# Patient Record
Sex: MALE | Race: OTHER | HISPANIC OR LATINO | Employment: OTHER | URBAN - METROPOLITAN AREA
[De-identification: names, ages, dates, MRNs, and addresses within clinical notes are randomized per-mention and may not be internally consistent; named-entity substitution may affect disease eponyms.]

---

## 2017-12-19 ENCOUNTER — APPOINTMENT (EMERGENCY)
Dept: CT IMAGING | Facility: HOSPITAL | Age: 37
End: 2017-12-19
Payer: COMMERCIAL

## 2017-12-19 ENCOUNTER — HOSPITAL ENCOUNTER (EMERGENCY)
Facility: HOSPITAL | Age: 37
Discharge: HOME | End: 2017-12-19
Attending: EMERGENCY MEDICINE | Admitting: EMERGENCY MEDICINE
Payer: COMMERCIAL

## 2017-12-19 VITALS
OXYGEN SATURATION: 95 % | DIASTOLIC BLOOD PRESSURE: 92 MMHG | HEART RATE: 94 BPM | SYSTOLIC BLOOD PRESSURE: 142 MMHG | TEMPERATURE: 98 F | RESPIRATION RATE: 18 BRPM | WEIGHT: 200.4 LBS

## 2017-12-19 DIAGNOSIS — S30.0XXA CONTUSION OF LOWER BACK, INITIAL ENCOUNTER: Primary | ICD-10-CM

## 2017-12-19 LAB
ALBUMIN SERPL BCP-MCNC: 3.7 G/DL (ref 3.5–5)
ALP SERPL-CCNC: 57 U/L (ref 46–116)
ALT SERPL W P-5'-P-CCNC: 27 U/L (ref 12–78)
ANION GAP SERPL CALCULATED.3IONS-SCNC: 8 MMOL/L (ref 4–13)
AST SERPL W P-5'-P-CCNC: 19 U/L (ref 5–45)
BACTERIA UR QL AUTO: ABNORMAL /HPF
BASOPHILS # BLD AUTO: 0.02 THOUSANDS/ΜL (ref 0–0.1)
BASOPHILS NFR BLD AUTO: 0 % (ref 0–1)
BILIRUB SERPL-MCNC: 0.27 MG/DL (ref 0.2–1)
BILIRUB UR QL STRIP: NEGATIVE
BUN SERPL-MCNC: 14 MG/DL (ref 5–25)
CALCIUM SERPL-MCNC: 8.7 MG/DL (ref 8.3–10.1)
CHLORIDE SERPL-SCNC: 105 MMOL/L (ref 100–108)
CLARITY UR: CLEAR
CO2 SERPL-SCNC: 29 MMOL/L (ref 21–32)
COLOR UR: YELLOW
CREAT SERPL-MCNC: 1.3 MG/DL (ref 0.6–1.3)
EOSINOPHIL # BLD AUTO: 0.06 THOUSAND/ΜL (ref 0–0.61)
EOSINOPHIL NFR BLD AUTO: 1 % (ref 0–6)
ERYTHROCYTE [DISTWIDTH] IN BLOOD BY AUTOMATED COUNT: 12.9 % (ref 11.6–15.1)
GFR SERPL CREATININE-BSD FRML MDRD: 71 ML/MIN/1.73SQ M
GLUCOSE SERPL-MCNC: 122 MG/DL (ref 65–140)
GLUCOSE UR STRIP-MCNC: NEGATIVE MG/DL
HCT VFR BLD AUTO: 43.8 % (ref 36.5–49.3)
HGB BLD-MCNC: 14.7 G/DL (ref 12–17)
HGB UR QL STRIP.AUTO: NEGATIVE
KETONES UR STRIP-MCNC: ABNORMAL MG/DL
LEUKOCYTE ESTERASE UR QL STRIP: NEGATIVE
LIPASE SERPL-CCNC: 150 U/L (ref 73–393)
LYMPHOCYTES # BLD AUTO: 1.33 THOUSANDS/ΜL (ref 0.6–4.47)
LYMPHOCYTES NFR BLD AUTO: 13 % (ref 14–44)
MCH RBC QN AUTO: 30.3 PG (ref 26.8–34.3)
MCHC RBC AUTO-ENTMCNC: 33.6 G/DL (ref 31.4–37.4)
MCV RBC AUTO: 90 FL (ref 82–98)
MONOCYTES # BLD AUTO: 0.59 THOUSAND/ΜL (ref 0.17–1.22)
MONOCYTES NFR BLD AUTO: 6 % (ref 4–12)
MUCOUS THREADS UR QL AUTO: ABNORMAL
NEUTROPHILS # BLD AUTO: 8.34 THOUSANDS/ΜL (ref 1.85–7.62)
NEUTS SEG NFR BLD AUTO: 80 % (ref 43–75)
NITRITE UR QL STRIP: NEGATIVE
NON-SQ EPI CELLS URNS QL MICRO: ABNORMAL /HPF
NRBC BLD AUTO-RTO: 0 /100 WBCS
PH UR STRIP.AUTO: 7 [PH] (ref 4.5–8)
PLATELET # BLD AUTO: 214 THOUSANDS/UL (ref 149–390)
PMV BLD AUTO: 12.3 FL (ref 8.9–12.7)
POTASSIUM SERPL-SCNC: 3.9 MMOL/L (ref 3.5–5.3)
PROT SERPL-MCNC: 6.7 G/DL (ref 6.4–8.2)
PROT UR STRIP-MCNC: ABNORMAL MG/DL
RBC # BLD AUTO: 4.85 MILLION/UL (ref 3.88–5.62)
RBC #/AREA URNS AUTO: ABNORMAL /HPF
SODIUM SERPL-SCNC: 142 MMOL/L (ref 136–145)
SP GR UR STRIP.AUTO: 1.02 (ref 1–1.03)
UROBILINOGEN UR QL STRIP.AUTO: 0.2 E.U./DL
WBC # BLD AUTO: 10.34 THOUSAND/UL (ref 4.31–10.16)
WBC #/AREA URNS AUTO: ABNORMAL /HPF

## 2017-12-19 PROCEDURE — 36415 COLL VENOUS BLD VENIPUNCTURE: CPT | Performed by: EMERGENCY MEDICINE

## 2017-12-19 PROCEDURE — 81001 URINALYSIS AUTO W/SCOPE: CPT

## 2017-12-19 PROCEDURE — 99284 EMERGENCY DEPT VISIT MOD MDM: CPT

## 2017-12-19 PROCEDURE — 96361 HYDRATE IV INFUSION ADD-ON: CPT

## 2017-12-19 PROCEDURE — 85025 COMPLETE CBC W/AUTO DIFF WBC: CPT | Performed by: EMERGENCY MEDICINE

## 2017-12-19 PROCEDURE — 74177 CT ABD & PELVIS W/CONTRAST: CPT

## 2017-12-19 PROCEDURE — 96374 THER/PROPH/DIAG INJ IV PUSH: CPT

## 2017-12-19 PROCEDURE — 83690 ASSAY OF LIPASE: CPT | Performed by: EMERGENCY MEDICINE

## 2017-12-19 PROCEDURE — 80053 COMPREHEN METABOLIC PANEL: CPT | Performed by: EMERGENCY MEDICINE

## 2017-12-19 PROCEDURE — 81002 URINALYSIS NONAUTO W/O SCOPE: CPT | Performed by: EMERGENCY MEDICINE

## 2017-12-19 RX ORDER — IBUPROFEN 600 MG/1
600 TABLET ORAL EVERY 6 HOURS PRN
Qty: 20 TABLET | Refills: 0 | Status: SHIPPED | OUTPATIENT
Start: 2017-12-19 | End: 2018-10-15

## 2017-12-19 RX ORDER — KETOROLAC TROMETHAMINE 30 MG/ML
30 INJECTION, SOLUTION INTRAMUSCULAR; INTRAVENOUS ONCE
Status: COMPLETED | OUTPATIENT
Start: 2017-12-19 | End: 2017-12-19

## 2017-12-19 RX ORDER — CYCLOBENZAPRINE HCL 10 MG
10 TABLET ORAL 2 TIMES DAILY PRN
Qty: 20 TABLET | Refills: 0 | Status: SHIPPED | OUTPATIENT
Start: 2017-12-19 | End: 2018-10-15

## 2017-12-19 RX ADMIN — IOHEXOL 100 ML: 350 INJECTION, SOLUTION INTRAVENOUS at 17:00

## 2017-12-19 RX ADMIN — SODIUM CHLORIDE 1000 ML: 0.9 INJECTION, SOLUTION INTRAVENOUS at 16:19

## 2017-12-19 RX ADMIN — KETOROLAC TROMETHAMINE 30 MG: 30 INJECTION, SOLUTION INTRAMUSCULAR at 16:19

## 2017-12-19 NOTE — DISCHARGE INSTRUCTIONS

## 2017-12-19 NOTE — ED PROVIDER NOTES
History  Chief Complaint   Patient presents with    Flank Pain     Slipped and fell this morning onto L flank area; reports L flank pain and lower back pain  Patient slipped and fell this morning hitting his left lower back on a step  Since then he has had a lot of pain  No hematuria, no fevers, no nausea/vomiting, no abdominal pain  Patient is eating okay            None       History reviewed  No pertinent past medical history  History reviewed  No pertinent surgical history  History reviewed  No pertinent family history  I have reviewed and agree with the history as documented  Social History   Substance Use Topics    Smoking status: Never Smoker    Smokeless tobacco: Never Used    Alcohol use No        Review of Systems   Constitutional: Negative for appetite change, fatigue and fever  HENT: Negative for rhinorrhea and sore throat  Respiratory: Negative for cough, shortness of breath and wheezing  Cardiovascular: Negative for chest pain and leg swelling  Gastrointestinal: Negative for abdominal pain, diarrhea and vomiting  Genitourinary: Negative for dysuria  Musculoskeletal: Positive for back pain  Negative for neck pain  Skin: Negative for rash  Neurological: Negative for syncope and headaches     Psychiatric/Behavioral:        Mood normal       Physical Exam  ED Triage Vitals   Temperature Pulse Respirations Blood Pressure SpO2   12/19/17 1540 12/19/17 1540 12/19/17 1540 12/19/17 1540 12/19/17 1540   98 °F (36 7 °C) (!) 117 18 165/99 98 %      Temp Source Heart Rate Source Patient Position - Orthostatic VS BP Location FiO2 (%)   12/19/17 1540 12/19/17 1740 12/19/17 1740 12/19/17 1740 --   Temporal Monitor Lying Left arm       Pain Score       12/19/17 1540       Worst Possible Pain           Orthostatic Vital Signs  Vitals:    12/19/17 1540 12/19/17 1740   BP: 165/99 142/92   Pulse: (!) 117 94   Patient Position - Orthostatic VS:  Lying       Physical Exam Constitutional: He is oriented to person, place, and time  He appears well-developed and well-nourished  HENT:   Head: Normocephalic and atraumatic  Neck: Normal range of motion  Neck supple  Cardiovascular: Normal rate and regular rhythm  Pulmonary/Chest: Effort normal and breath sounds normal    Abdominal: Soft  There is no tenderness  Musculoskeletal:   No spinal tenderness, +L lower lumbar tenderness, +abrasion   Neurological: He is alert and oriented to person, place, and time  Skin:   Abrasion L lower back   Nursing note and vitals reviewed  ED Medications  Medications   sodium chloride 0 9 % bolus 1,000 mL (0 mL Intravenous Stopped 12/19/17 1722)   ketorolac (TORADOL) injection 30 mg (30 mg Intravenous Given 12/19/17 1619)   iohexol (OMNIPAQUE) 350 MG/ML injection (MULTI-DOSE) 100 mL (100 mL Intravenous Given 12/19/17 1700)       Diagnostic Studies  Results Reviewed     Procedure Component Value Units Date/Time    Comprehensive metabolic panel [[id number]] Collected:  12/19/17 1619    Lab Status:  Final result Specimen:  Blood from Arm, Right Updated:  12/19/17 1646     Sodium 142 mmol/L      Potassium 3 9 mmol/L      Chloride 105 mmol/L      CO2 29 mmol/L      Anion Gap 8 mmol/L      BUN 14 mg/dL      Creatinine 1 30 mg/dL      Glucose 122 mg/dL      Calcium 8 7 mg/dL      AST 19 U/L      ALT 27 U/L      Alkaline Phosphatase 57 U/L      Total Protein 6 7 g/dL      Albumin 3 7 g/dL      Total Bilirubin 0 27 mg/dL      eGFR 71 ml/min/1 73sq m     Narrative:         National Kidney Disease Education Program recommendations are as follows:  GFR calculation is accurate only with a steady state creatinine  Chronic Kidney disease less than 60 ml/min/1 73 sq  meters  Kidney failure less than 15 ml/min/1 73 sq  meters      Lipase [[id number]]  (Normal) Collected:  12/19/17 1619    Lab Status:  Final result Specimen:  Blood from Arm, Right Updated:  12/19/17 1646     Lipase 150 u/L     Urine Microscopic [[id number]]  (Abnormal) Collected:  12/19/17 1641    Lab Status:  Final result Specimen:  Urine from Urine, Clean Catch Updated:  12/19/17 1637     RBC, UA 0-1 (A) /hpf      WBC, UA 0-1 (A) /hpf      Epithelial Cells Occasional /hpf      Bacteria, UA None Seen /hpf      MUCOUS THREADS Occasional    CBC and differential [[id number]]  (Abnormal) Collected:  12/19/17 1619    Lab Status:  Final result Specimen:  Blood from Arm, Right Updated:  12/19/17 1628     WBC 10 34 (H) Thousand/uL      RBC 4 85 Million/uL      Hemoglobin 14 7 g/dL      Hematocrit 43 8 %      MCV 90 fL      MCH 30 3 pg      MCHC 33 6 g/dL      RDW 12 9 %      MPV 12 3 fL      Platelets 949 Thousands/uL      nRBC 0 /100 WBCs      Neutrophils Relative 80 (H) %      Lymphocytes Relative 13 (L) %      Monocytes Relative 6 %      Eosinophils Relative 1 %      Basophils Relative 0 %      Neutrophils Absolute 8 34 (H) Thousands/µL      Lymphocytes Absolute 1 33 Thousands/µL      Monocytes Absolute 0 59 Thousand/µL      Eosinophils Absolute 0 06 Thousand/µL      Basophils Absolute 0 02 Thousands/µL     POCT urinalysis dipstick [[id number]]  (Abnormal) Resulted:  12/19/17 1626    Lab Status:  Final result Specimen:  Urine Updated:  12/19/17 1626    ED Urine Macroscopic [[id number]]  (Abnormal) Collected:  12/19/17 1641    Lab Status:  Final result Specimen:  Urine Updated:  12/19/17 1624     Color, UA Yellow     Clarity, UA Clear     pH, UA 7 0     Leukocytes, UA Negative     Nitrite, UA Negative     Protein, UA Trace (A) mg/dl      Glucose, UA Negative mg/dl      Ketones, UA Trace (A) mg/dl      Urobilinogen, UA 0 2 E U /dl      Bilirubin, UA Negative     Blood, UA Negative     Specific Gravity, UA 1 020    Narrative:       CL[facility]K RESULT                 CT abdomen pelvis with contrast   Final Result by Verna Fortune MD (12/19 1729)      Left posterior subcutaneous fat contusions without underlying fracture or organ parenchymal injury    No soft tissue hematoma  Workstation performed: [id number]                    Procedures  Procedures       Phone Contacts  ED Phone Contact    ED Course  ED Course                                MDM  CritCare Time    Disposition  Final diagnoses:   Contusion of lower back, initial encounter     Time reflects when diagnosis was documented in both MDM as applicable and the Disposition within this note     Time User Action Codes Description Comment    12/19/2017  5:36 PM Ángela Bentley Add [S30  0XXA] Contusion of lower back, initial encounter       ED Disposition     ED Disposition Condition Comment    Discharge  Jessica Ewelina discharge to home/self care  Condition at discharge: Stable        Follow-up Information     Follow up With Specialties Details Why Contact Info    Jillian Romeo, [address]  [phone]          Discharge Medication List as of 12/19/2017  5:39 PM      START taking these medications    Details   cyclobenzaprine (FLEXERIL) 10 mg tablet Take 1 tablet by mouth 2 (two) times a day as needed for muscle spasms, Starting Tue 12/19/2017, Print      ibuprofen (MOTRIN) 600 mg tablet Take 1 tablet by mouth every 6 (six) hours as needed for mild pain, Starting Tue 12/19/2017, Print           No discharge procedures on file      ED Provider  Electronically Signed by           Penny Phipps MD  12/19/17 0007

## 2018-03-01 NOTE — MISCELLANEOUS
Provider Comments  Provider Comments:   Patient was a no show to today's appointment at 0900        Signatures   Electronically signed by : NATI Escalera; Aug 12 2016 10:08AM EST                       (Author)

## 2018-10-15 ENCOUNTER — HOSPITAL ENCOUNTER (EMERGENCY)
Facility: HOSPITAL | Age: 38
Discharge: HOME | End: 2018-10-15
Attending: EMERGENCY MEDICINE | Admitting: EMERGENCY MEDICINE
Payer: COMMERCIAL

## 2018-10-15 ENCOUNTER — APPOINTMENT (EMERGENCY)
Dept: RADIOLOGY | Facility: HOSPITAL | Age: 38
End: 2018-10-15
Payer: COMMERCIAL

## 2018-10-15 VITALS
TEMPERATURE: 98.4 F | SYSTOLIC BLOOD PRESSURE: 145 MMHG | HEART RATE: 70 BPM | BODY MASS INDEX: 27.12 KG/M2 | OXYGEN SATURATION: 98 % | RESPIRATION RATE: 16 BRPM | DIASTOLIC BLOOD PRESSURE: 95 MMHG | WEIGHT: 200 LBS

## 2018-10-15 DIAGNOSIS — R07.89 CHEST WALL PAIN: Primary | ICD-10-CM

## 2018-10-15 LAB
ANION GAP SERPL CALCULATED.3IONS-SCNC: 4 MMOL/L (ref 4–13)
ATRIAL RATE: 69 BPM
BASOPHILS # BLD AUTO: 0.03 THOUSANDS/ΜL (ref 0–0.1)
BASOPHILS NFR BLD AUTO: 1 % (ref 0–1)
BUN SERPL-MCNC: 14 MG/DL (ref 5–25)
CALCIUM SERPL-MCNC: 9.2 MG/DL (ref 8.3–10.1)
CHLORIDE SERPL-SCNC: 103 MMOL/L (ref 100–108)
CO2 SERPL-SCNC: 31 MMOL/L (ref 21–32)
CREAT SERPL-MCNC: 0.93 MG/DL (ref 0.6–1.3)
EOSINOPHIL # BLD AUTO: 0.08 THOUSAND/ΜL (ref 0–0.61)
EOSINOPHIL NFR BLD AUTO: 2 % (ref 0–6)
ERYTHROCYTE [DISTWIDTH] IN BLOOD BY AUTOMATED COUNT: 11.9 % (ref 11.6–15.1)
GFR SERPL CREATININE-BSD FRML MDRD: 106 ML/MIN/1.73SQ M
GLUCOSE SERPL-MCNC: 95 MG/DL (ref 65–140)
HCT VFR BLD AUTO: 49.7 % (ref 36.5–49.3)
HGB BLD-MCNC: 16.5 G/DL (ref 12–17)
IMM GRANULOCYTES # BLD AUTO: 0.02 THOUSAND/UL (ref 0–0.2)
IMM GRANULOCYTES NFR BLD AUTO: 0 % (ref 0–2)
LYMPHOCYTES # BLD AUTO: 1.04 THOUSANDS/ΜL (ref 0.6–4.47)
LYMPHOCYTES NFR BLD AUTO: 21 % (ref 14–44)
MCH RBC QN AUTO: 29.8 PG (ref 26.8–34.3)
MCHC RBC AUTO-ENTMCNC: 33.2 G/DL (ref 31.4–37.4)
MCV RBC AUTO: 90 FL (ref 82–98)
MONOCYTES # BLD AUTO: 0.43 THOUSAND/ΜL (ref 0.17–1.22)
MONOCYTES NFR BLD AUTO: 9 % (ref 4–12)
NEUTROPHILS # BLD AUTO: 3.26 THOUSANDS/ΜL (ref 1.85–7.62)
NEUTS SEG NFR BLD AUTO: 67 % (ref 43–75)
NRBC BLD AUTO-RTO: 0 /100 WBCS
P AXIS: 80 DEGREES
PLATELET # BLD AUTO: 260 THOUSANDS/UL (ref 149–390)
PMV BLD AUTO: 12 FL (ref 8.9–12.7)
POTASSIUM SERPL-SCNC: 3.9 MMOL/L (ref 3.5–5.3)
PR INTERVAL: 120 MS
QRS AXIS: 98 DEGREES
QRSD INTERVAL: 90 MS
QT INTERVAL: 372 MS
QTC INTERVAL: 398 MS
RBC # BLD AUTO: 5.53 MILLION/UL (ref 3.88–5.62)
SODIUM SERPL-SCNC: 138 MMOL/L (ref 136–145)
T WAVE AXIS: 58 DEGREES
TROPONIN I SERPL-MCNC: <0.02 NG/ML
VENTRICULAR RATE: 69 BPM
WBC # BLD AUTO: 4.86 THOUSAND/UL (ref 4.31–10.16)

## 2018-10-15 PROCEDURE — 99285 EMERGENCY DEPT VISIT HI MDM: CPT

## 2018-10-15 PROCEDURE — 85025 COMPLETE CBC W/AUTO DIFF WBC: CPT | Performed by: EMERGENCY MEDICINE

## 2018-10-15 PROCEDURE — 93010 ELECTROCARDIOGRAM REPORT: CPT | Performed by: INTERNAL MEDICINE

## 2018-10-15 PROCEDURE — 80048 BASIC METABOLIC PNL TOTAL CA: CPT | Performed by: EMERGENCY MEDICINE

## 2018-10-15 PROCEDURE — 93005 ELECTROCARDIOGRAM TRACING: CPT

## 2018-10-15 PROCEDURE — 84484 ASSAY OF TROPONIN QUANT: CPT | Performed by: EMERGENCY MEDICINE

## 2018-10-15 PROCEDURE — 36415 COLL VENOUS BLD VENIPUNCTURE: CPT | Performed by: EMERGENCY MEDICINE

## 2018-10-15 PROCEDURE — 71046 X-RAY EXAM CHEST 2 VIEWS: CPT

## 2018-10-15 RX ORDER — NAPROXEN 500 MG/1
500 TABLET ORAL EVERY 12 HOURS PRN
Qty: 15 TABLET | Refills: 0 | Status: SHIPPED | OUTPATIENT
Start: 2018-10-15

## 2018-10-15 NOTE — ED PROCEDURE NOTE
PROCEDURE  ECG 12 Lead Documentation  Date/Time: 10/15/2018 10:19 AM  Performed by: Martín Kirk  Authorized by: Martín Kirk     Indications / Diagnosis:  Chest pain  ECG reviewed by me, the ED Provider: yes    Patient location:  ED  Interpretation:     Interpretation: normal    Rate:     ECG rate assessment: normal    Rhythm:     Rhythm: sinus rhythm    Ectopy:     Ectopy: PVCs      PVCs:  Infrequent  Conduction:     Conduction: normal    ST segments:     ST segments:  Normal  T waves:     T waves: normal           Preeti Clifton DO  10/15/18 1020

## 2018-10-15 NOTE — ED PROVIDER NOTES
History  Chief Complaint   Patient presents with    Chest Pain     chest pain comes and goes x several days  reports feels like a "pinch"  worse w/when wearing seatbelt  denies fever/recent illness  occasional nausea w/the pain     44-year-old male with no past medical history presents to the emergency department with central chest pain that has been off and on for the last 2 to 3 days  Patient denies any trauma or heavy lifting  No shortness of breath  He states the pain seems to be worse whenever he puts his seatbelt on or after he eats  No diaphoresis or vomiting  No radiation of the pain  No exertional component  Chest Pain   Chest pain location: Entire precordium  Pain quality comment:  Pinching  Pain radiates to:  Does not radiate  Pain radiates to the back: no    Pain severity:  Unable to specify  Onset quality:  Gradual  Duration:  3 days  Timing:  Intermittent  Chronicity:  New  Context: at rest    Context: not breathing, not lifting, no movement, no stress and no trauma    Relieved by:  None tried  Worsened by:  Nothing tried  Ineffective treatments:  None tried  Associated symptoms: no abdominal pain, no altered mental status, no anorexia, no anxiety, no back pain, no claudication, no cough, no diaphoresis, no dizziness, no dysphagia, no fatigue, no fever, no headache, no heartburn, no lower extremity edema, no nausea, no near-syncope, no numbness, no orthopnea, no palpitations, no PND, no shortness of breath, no syncope, not vomiting and no weakness    Risk factors: male sex    Risk factors: no coronary artery disease, no diabetes mellitus, no high cholesterol, no hypertension, not obese, no prior DVT/PE and no smoking        None       History reviewed  No pertinent past medical history  History reviewed  No pertinent surgical history  History reviewed  No pertinent family history  I have reviewed and agree with the history as documented      Social History   Substance Use Topics    Smoking status: Never Smoker    Smokeless tobacco: Never Used    Alcohol use Yes      Comment: occasional        Review of Systems   Constitutional: Negative  Negative for diaphoresis, fatigue and fever  HENT: Negative  Negative for trouble swallowing  Eyes: Negative  Respiratory: Negative  Negative for cough and shortness of breath  Cardiovascular: Positive for chest pain  Negative for palpitations, orthopnea, claudication, leg swelling, syncope, PND and near-syncope  Gastrointestinal: Negative  Negative for abdominal pain, anorexia, heartburn, nausea and vomiting  Genitourinary: Negative  Musculoskeletal: Negative for back pain and neck pain  Skin: Negative  Allergic/Immunologic: Negative  Neurological: Negative  Negative for dizziness, weakness, numbness and headaches  Hematological: Negative  Psychiatric/Behavioral: Negative  All other systems reviewed and are negative  Physical Exam  Physical Exam   Constitutional: He is oriented to person, place, and time  He appears well-developed and well-nourished  Non-toxic appearance  He does not have a sickly appearance  He does not appear ill  No distress  HENT:   Head: Normocephalic and atraumatic  Right Ear: External ear normal    Left Ear: External ear normal    Mouth/Throat: Oropharynx is clear and moist  No oropharyngeal exudate  Eyes: Pupils are equal, round, and reactive to light  Conjunctivae are normal  No scleral icterus  Neck: Normal range of motion  Neck supple  No JVD present  Cardiovascular: Normal rate, regular rhythm and normal heart sounds  Pulmonary/Chest: Effort normal and breath sounds normal    Abdominal: Soft  Bowel sounds are normal  He exhibits no distension and no mass  There is no tenderness  No hernia  Musculoskeletal: Normal range of motion  He exhibits no edema, tenderness or deformity  Lymphadenopathy:     He has no cervical adenopathy     Neurological: He is alert and oriented to person, place, and time  He has normal strength and normal reflexes  He exhibits normal muscle tone  Skin: Skin is warm and dry  No rash noted  He is not diaphoretic  No erythema  No pallor  Psychiatric: He has a normal mood and affect  Nursing note and vitals reviewed  Vital Signs  ED Triage Vitals   Temperature Pulse Respirations Blood Pressure SpO2   10/15/18 1057 10/15/18 1013 10/15/18 1013 10/15/18 1013 10/15/18 1013   98 4 °F (36 9 °C) 70 16 (!) 164/109 100 %      Temp Source Heart Rate Source Patient Position - Orthostatic VS BP Location FiO2 (%)   10/15/18 1057 10/15/18 1013 10/15/18 1013 10/15/18 1013 --   Oral Monitor Sitting Left arm       Pain Score       10/15/18 1013       4           Vitals:    10/15/18 1013 10/15/18 1017 10/15/18 1057   BP: (!) 164/109 (!) 155/102 145/95   Pulse: 70  70   Patient Position - Orthostatic VS: Sitting Sitting Sitting       Visual Acuity      ED Medications  Medications - No data to display    Diagnostic Studies  Results Reviewed     Procedure Component Value Units Date/Time    Troponin I [[id number]]  (Normal) Collected:  10/15/18 1030    Lab Status:  Final result Specimen:  Blood from Arm, Right Updated:  10/15/18 1104     Troponin I <0 02 ng/mL     Basic metabolic panel [[id number]] Collected:  10/15/18 1030    Lab Status:  Final result Specimen:  Blood from Arm, Right Updated:  10/15/18 1054     Sodium 138 mmol/L      Potassium 3 9 mmol/L      Chloride 103 mmol/L      CO2 31 mmol/L      ANION GAP 4 mmol/L      BUN 14 mg/dL      Creatinine 0 93 mg/dL      Glucose 95 mg/dL      Calcium 9 2 mg/dL      eGFR 106 ml/min/1 73sq m     Narrative:         National Kidney Disease Education Program recommendations are as follows:  GFR calculation is accurate only with a steady state creatinine  Chronic Kidney disease less than 60 ml/min/1 73 sq  meters  Kidney failure less than 15 ml/min/1 73 sq  meters      CBC and differential [[id number]]  (Abnormal) Collected:  10/15/18 1030    Lab Status:  Final result Specimen:  Blood from Arm, Right Updated:  10/15/18 1037     WBC 4 86 Thousand/uL      RBC 5 53 Million/uL      Hemoglobin 16 5 g/dL      Hematocrit 49 7 (H) %      MCV 90 fL      MCH 29 8 pg      MCHC 33 2 g/dL      RDW 11 9 %      MPV 12 0 fL      Platelets 832 Thousands/uL      nRBC 0 /100 WBCs      Neutrophils Relative 67 %      Immat GRANS % 0 %      Lymphocytes Relative 21 %      Monocytes Relative 9 %      Eosinophils Relative 2 %      Basophils Relative 1 %      Neutrophils Absolute 3 26 Thousands/µL      Immature Grans Absolute 0 02 Thousand/uL      Lymphocytes Absolute 1 04 Thousands/µL      Monocytes Absolute 0 43 Thousand/µL      Eosinophils Absolute 0 08 Thousand/µL      Basophils Absolute 0 03 Thousands/µL                  XR chest 2 views   ED Interpretation by Oneyda Ford DO (10/15 1117)   nad                 Procedures  Procedures       Phone Contacts  ED Phone Contact    ED Course         HEART Risk Score      Most Recent Value   History  0 Filed at: 10/15/2018 1038   ECG  0 Filed at: 10/15/2018 1038   Age  0 Filed at: 10/15/2018 1038   Risk Factors  0 Filed at: 10/15/2018 1038   Troponin  0 Filed at: 10/15/2018 1038   Heart Score Risk Calculator   History  0 Filed at: 10/15/2018 1038   ECG  0 Filed at: 10/15/2018 1038   Age  0 Filed at: 10/15/2018 1038   Risk Factors  0 Filed at: 10/15/2018 1038   Troponin  0 Filed at: 10/15/2018 1038   HEART Score  0 Filed at: 10/15/2018 1038   HEART Score  0 Filed at: 10/15/2018 1038                            MDM  Number of Diagnoses or Management Options  Diagnosis management comments: 19-year-old male presents with a 3 day history of pain across precordial chest which she describes as a pinch  He states is worse with putting his seatbelt on  No other symptoms  No risk factors for coronary artery disease  Cardiac workup already in progress  He has a heart score 0   This pain may be more musculoskeletal   Highly doubt acute coronary syndrome with his lack of risk factors and the type of pain that he is having  Patient will be stable for discharge       Amount and/or Complexity of Data Reviewed  Clinical lab tests: ordered and reviewed  Tests in the radiology section of CPT®: ordered and reviewed  Independent visualization of images, tracings, or specimens: yes      CritCare Time    Disposition  Final diagnoses:   Chest wall pain     Time reflects when diagnosis was documented in both MDM as applicable and the Disposition within this note     Time User Action Codes Description Comment    10/15/2018 11:18 AM Tomasa Caputo Add [R07 89] Chest wall pain       ED Disposition     ED Disposition Condition Comment    Discharge  Zeina Meyer discharge to home/self care  Condition at discharge: Good        Follow-up Information     Follow up With Specialties Details Why Contact Info    Jonathan Cabrales PA-C Family Medicine, Physician Assistant Schedule an appointment as soon as possible for a visit in 2 days  [facility] [address] [phone]            Patient's Medications   Discharge Prescriptions    NAPROXEN (NAPROSYN) 500 MG TABLET    Take 1 tablet (500 mg total) by mouth every 12 (twelve) hours as needed for mild pain or moderate pain       Start Date: 10/15/2018End Date: --       Order Dose: 500 mg       Quantity: 15 tablet    Refills: 0       Outpatient Discharge Orders  Stress test only, exercise   Standing Status: Future  Standing Exp   Date: 10/15/22         ED Provider  Electronically Signed by           Clinton Payne DO  10/15/18 6156

## 2018-10-15 NOTE — DISCHARGE INSTRUCTIONS
Chest Wall Pain   WHAT YOU NEED TO KNOW:   Chest wall pain may be caused by problems with the muscles, cartilage, or bones of the chest wall  Chest wall pain may also be caused by pain that spreads to your chest from another part of your body  The pain may be aching, severe, dull, or sharp  It may come and go, or it may be constant  The pain may be worse when you move in certain ways, breathe deeply, or cough  DISCHARGE INSTRUCTIONS:   Call 911 if:   · You have any of the following signs of a heart attack:      ¨ Squeezing, pressure, or pain in your chest that lasts longer than 5 minutes or returns    ¨ Discomfort or pain in your back, neck, jaw, stomach, or arm     ¨ Trouble breathing    ¨ Nausea or vomiting    ¨ Lightheadedness or a sudden cold sweat, especially with chest pain or trouble breathing    Return to the emergency department if:   · You have severe pain  Contact your healthcare provider if:   · You develop a rash  · You have other new symptoms  · Your pain does not improve, even with treatment  · You have questions or concerns about your condition or care  Medicines: You may need any of the following:  · NSAIDs , such as ibuprofen, help decrease swelling, pain, and fever  This medicine is available with or without a doctor's order  NSAIDs can cause stomach bleeding or kidney problems in certain people  If you take blood thinner medicine, always ask your healthcare provider if NSAIDs are safe for you  Always read the medicine label and follow directions  · Acetaminophen  decreases pain  It is available without a doctor's order  Ask how much to take and how often to take it  Follow directions  Acetaminophen can cause liver damage if not taken correctly  · A cream  may be applied to your chest to decrease pain  · Take your medicine as directed  Contact your healthcare provider if you think your medicine is not helping or if you have side effects   Tell him of her if you are allergic to any medicine  Keep a list of the medicines, vitamins, and herbs you take  Include the amounts, and when and why you take them  Bring the list or the pill bottles to follow-up visits  Carry your medicine list with you in case of an emergency  Follow up with your healthcare provider as directed:  Write down your questions so you remember to ask them during your visits  Self-care:   · Rest  as needed  Avoid activities that make your chest wall pain worse  · Apply heat  on your chest for 20 to 30 minutes every 2 hours for as many days as directed  Heat helps decrease pain and muscle spasms  · Apply ice  on your chest for 15 to 20 minutes every hour or as directed  Use an ice pack, or put crushed ice in a plastic bag  Cover it with a towel  Ice helps prevent tissue damage and decreases swelling and pain  © 2017 [employer]  Information is for End User's use only and may not be sold, redistributed or otherwise used for commercial purposes  All illustrations and images included in CareNotes® are the copyrighted property of [employer]  or [employer] Daniel[employer]  The above information is an  only  It is not intended as medical advice for individual conditions or treatments  Talk to your doctor, nurse or pharmacist before following any medical regimen to see if it is safe and effective for you

## 2018-10-18 ENCOUNTER — HOSPITAL ENCOUNTER (OUTPATIENT)
Dept: NON INVASIVE DIAGNOSTICS | Facility: HOSPITAL | Age: 38
Discharge: HOME | End: 2018-10-18
Attending: EMERGENCY MEDICINE
Payer: COMMERCIAL

## 2018-10-18 DIAGNOSIS — R07.89 CHEST WALL PAIN: ICD-10-CM

## 2018-10-18 LAB
CHEST PAIN STATEMENT: NORMAL
MAX DIASTOLIC BP: 70 MMHG
MAX HEART RATE: 184 BPM
MAX PREDICTED HEART RATE: 185 BPM
MAX. SYSTOLIC BP: 180 MMHG
PROTOCOL NAME: NORMAL
TARGET HR FORMULA: NORMAL
TEST INDICATION: NORMAL
TIME IN EXERCISE PHASE: NORMAL

## 2018-10-18 PROCEDURE — 93017 CV STRESS TEST TRACING ONLY: CPT

## 2018-10-19 PROCEDURE — 93018 CV STRESS TEST I&R ONLY: CPT | Performed by: INTERNAL MEDICINE

## 2018-10-19 PROCEDURE — 93016 CV STRESS TEST SUPVJ ONLY: CPT | Performed by: INTERNAL MEDICINE

## 2018-10-26 ENCOUNTER — OFFICE VISIT (OUTPATIENT)
Dept: FAMILY MEDICINE CLINIC | Facility: CLINIC | Age: 38
End: 2018-10-26
Payer: COMMERCIAL

## 2018-10-26 ENCOUNTER — APPOINTMENT (OUTPATIENT)
Dept: LAB | Facility: CLINIC | Age: 38
End: 2018-10-26
Payer: COMMERCIAL

## 2018-10-26 VITALS
BODY MASS INDEX: 27.63 KG/M2 | WEIGHT: 204 LBS | DIASTOLIC BLOOD PRESSURE: 98 MMHG | OXYGEN SATURATION: 97 % | TEMPERATURE: 97.4 F | RESPIRATION RATE: 18 BRPM | HEART RATE: 80 BPM | HEIGHT: 72 IN | SYSTOLIC BLOOD PRESSURE: 128 MMHG

## 2018-10-26 DIAGNOSIS — I10 ESSENTIAL HYPERTENSION: Primary | ICD-10-CM

## 2018-10-26 DIAGNOSIS — I10 ESSENTIAL HYPERTENSION: ICD-10-CM

## 2018-10-26 LAB
ALBUMIN SERPL BCP-MCNC: 4 G/DL (ref 3.5–5)
ALP SERPL-CCNC: 46 U/L (ref 46–116)
ALT SERPL W P-5'-P-CCNC: 25 U/L (ref 12–78)
ANION GAP SERPL CALCULATED.3IONS-SCNC: 3 MMOL/L (ref 4–13)
AST SERPL W P-5'-P-CCNC: 12 U/L (ref 5–45)
BASOPHILS # BLD AUTO: 0.03 THOUSANDS/ΜL (ref 0–0.1)
BASOPHILS NFR BLD AUTO: 1 % (ref 0–1)
BILIRUB SERPL-MCNC: 0.39 MG/DL (ref 0.2–1)
BUN SERPL-MCNC: 12 MG/DL (ref 5–25)
CALCIUM SERPL-MCNC: 8.8 MG/DL (ref 8.3–10.1)
CHLORIDE SERPL-SCNC: 106 MMOL/L (ref 100–108)
CHOLEST SERPL-MCNC: 169 MG/DL (ref 50–200)
CO2 SERPL-SCNC: 30 MMOL/L (ref 21–32)
CREAT SERPL-MCNC: 0.88 MG/DL (ref 0.6–1.3)
EOSINOPHIL # BLD AUTO: 0.14 THOUSAND/ΜL (ref 0–0.61)
EOSINOPHIL NFR BLD AUTO: 3 % (ref 0–6)
ERYTHROCYTE [DISTWIDTH] IN BLOOD BY AUTOMATED COUNT: 12.1 % (ref 11.6–15.1)
GFR SERPL CREATININE-BSD FRML MDRD: 111 ML/MIN/1.73SQ M
GLUCOSE P FAST SERPL-MCNC: 80 MG/DL (ref 65–99)
HCT VFR BLD AUTO: 46.9 % (ref 36.5–49.3)
HDLC SERPL-MCNC: 48 MG/DL (ref 40–60)
HGB BLD-MCNC: 15.1 G/DL (ref 12–17)
IMM GRANULOCYTES # BLD AUTO: 0.02 THOUSAND/UL (ref 0–0.2)
IMM GRANULOCYTES NFR BLD AUTO: 0 % (ref 0–2)
LDLC SERPL CALC-MCNC: 102 MG/DL (ref 0–100)
LYMPHOCYTES # BLD AUTO: 1.16 THOUSANDS/ΜL (ref 0.6–4.47)
LYMPHOCYTES NFR BLD AUTO: 25 % (ref 14–44)
MCH RBC QN AUTO: 29.1 PG (ref 26.8–34.3)
MCHC RBC AUTO-ENTMCNC: 32.2 G/DL (ref 31.4–37.4)
MCV RBC AUTO: 90 FL (ref 82–98)
MONOCYTES # BLD AUTO: 0.42 THOUSAND/ΜL (ref 0.17–1.22)
MONOCYTES NFR BLD AUTO: 9 % (ref 4–12)
NEUTROPHILS # BLD AUTO: 2.86 THOUSANDS/ΜL (ref 1.85–7.62)
NEUTS SEG NFR BLD AUTO: 62 % (ref 43–75)
NONHDLC SERPL-MCNC: 121 MG/DL
NRBC BLD AUTO-RTO: 0 /100 WBCS
PLATELET # BLD AUTO: 227 THOUSANDS/UL (ref 149–390)
PMV BLD AUTO: 12.3 FL (ref 8.9–12.7)
POTASSIUM SERPL-SCNC: 4 MMOL/L (ref 3.5–5.3)
PROT SERPL-MCNC: 7 G/DL (ref 6.4–8.2)
RBC # BLD AUTO: 5.19 MILLION/UL (ref 3.88–5.62)
SODIUM SERPL-SCNC: 139 MMOL/L (ref 136–145)
TRIGL SERPL-MCNC: 97 MG/DL
WBC # BLD AUTO: 4.63 THOUSAND/UL (ref 4.31–10.16)

## 2018-10-26 PROCEDURE — 3725F SCREEN DEPRESSION PERFORMED: CPT | Performed by: PHYSICIAN ASSISTANT

## 2018-10-26 PROCEDURE — 99213 OFFICE O/P EST LOW 20 MIN: CPT | Performed by: PHYSICIAN ASSISTANT

## 2018-10-26 PROCEDURE — 3008F BODY MASS INDEX DOCD: CPT | Performed by: PHYSICIAN ASSISTANT

## 2018-10-26 PROCEDURE — 80061 LIPID PANEL: CPT

## 2018-10-26 PROCEDURE — 80053 COMPREHEN METABOLIC PANEL: CPT

## 2018-10-26 PROCEDURE — 85025 COMPLETE CBC W/AUTO DIFF WBC: CPT

## 2018-10-26 PROCEDURE — 1036F TOBACCO NON-USER: CPT | Performed by: PHYSICIAN ASSISTANT

## 2018-10-26 PROCEDURE — 36415 COLL VENOUS BLD VENIPUNCTURE: CPT

## 2018-10-26 RX ORDER — LISINOPRIL 5 MG/1
5 TABLET ORAL DAILY
Qty: 90 TABLET | Refills: 3 | Status: SHIPPED | OUTPATIENT
Start: 2018-10-26

## 2018-10-26 NOTE — PROGRESS NOTES
Assessment/Plan:    Essential hypertension  Discussed with patient that lab work, EKG, stress test have all came back normal   Blood pressure has been slightly elevated in the past   Would strongly encourage patient to start on a low dose of lisinopril to help lower his blood pressure  Discussed diet and exercise to help with blood pressure  Will recheck cholesterol level  Diagnoses and all orders for this visit:    Essential hypertension  -     CBC and differential; Future  -     Comprehensive metabolic panel; Future  -     Lipid panel; Future  -     lisinopril (ZESTRIL) 5 mg tablet; Take 1 tablet (5 mg total) by mouth daily          Subjective:      Patient ID: Alicia Wood is a 30 y o  male  42-year-old male presenting for follow-up of ED visit on 10/15/2018 for chest pain  Patient states for about 2 weeks prior to the ED visit, he was having a pinching sensation on the left side of his chest   States that the pain would come and go  Was unaware of any thing exacerbated the chest pain  Was not experiencing any shortness breath or palpitations during the episodes  States he never had episodes like this in the past   There is no trauma to the chest   Since being seen in the ED, states he has not been having any chest pain  Had a stress test completed on 10/18/2018, and was told to follow up with his PCP to get the results  Patient would like to have lab work to check his cholesterol levels  Denies any tobacco or alcohol use  The following portions of the patient's history were reviewed and updated as appropriate:   He  has no past medical history on file  He   Patient Active Problem List    Diagnosis Date Noted    Essential hypertension 10/26/2018     He  has no past surgical history on file  His family history is not on file  He  reports that he has never smoked  He has never used smokeless tobacco  He reports that he drinks alcohol  He reports that he does not use drugs    Current Outpatient Prescriptions   Medication Sig Dispense Refill    lisinopril (ZESTRIL) 5 mg tablet Take 1 tablet (5 mg total) by mouth daily 90 tablet 3    naproxen (NAPROSYN) 500 mg tablet Take 1 tablet (500 mg total) by mouth every 12 (twelve) hours as needed for mild pain or moderate pain (Patient not taking: Reported on 10/26/2018 ) 15 tablet 0     No current facility-administered medications for this visit  He has No Known Allergies       Review of Systems   Constitutional: Negative for chills, diaphoresis, fatigue and fever  Eyes: Negative for pain and visual disturbance  Respiratory: Negative for cough, chest tightness and shortness of breath  Cardiovascular: Negative for chest pain, palpitations and leg swelling  Gastrointestinal: Negative for abdominal pain  Genitourinary: Negative for dysuria and hematuria  Musculoskeletal: Negative for myalgias  Neurological: Negative for dizziness, syncope, speech difficulty, weakness, numbness and headaches  Objective:      /98   Pulse 80   Temp (!) 97 4 °F (36 3 °C) (Tympanic)   Resp 18   Ht 6' (1 829 m)   Wt 92 5 kg (204 lb)   SpO2 97%   BMI 27 67 kg/m²          Physical Exam   Constitutional: He is oriented to person, place, and time  He appears well-developed and well-nourished  No distress  Neck: Normal range of motion  Neck supple  Cardiovascular: Normal rate, regular rhythm and normal heart sounds  Exam reveals no gallop and no friction rub  No murmur heard  Pulmonary/Chest: Effort normal and breath sounds normal  No respiratory distress  He has no wheezes  He has no rales  Abdominal: Soft  Bowel sounds are normal  He exhibits no distension  There is no tenderness  There is no rebound and no guarding  Lymphadenopathy:     He has no cervical adenopathy  Neurological: He is alert and oriented to person, place, and time  No cranial nerve deficit  Skin: He is not diaphoretic     Psychiatric: He has a normal mood and affect  His behavior is normal  Thought content normal    Nursing note and vitals reviewed

## 2018-10-26 NOTE — ASSESSMENT & PLAN NOTE
Discussed with patient that lab work, EKG, stress test have all came back normal   Blood pressure has been slightly elevated in the past   Would strongly encourage patient to start on a low dose of lisinopril to help lower his blood pressure  Discussed diet and exercise to help with blood pressure  Will recheck cholesterol level

## 2020-09-02 ENCOUNTER — OFFICE VISIT (OUTPATIENT)
Dept: URGENT CARE | Age: 40
End: 2020-09-02
Payer: COMMERCIAL

## 2020-09-02 DIAGNOSIS — R50.9 FEVER AND CHILLS: Primary | ICD-10-CM

## 2020-09-02 PROCEDURE — U0003 INFECTIOUS AGENT DETECTION BY NUCLEIC ACID (DNA OR RNA); SEVERE ACUTE RESPIRATORY SYNDROME CORONAVIRUS 2 (SARS-COV-2) (CORONAVIRUS DISEASE [COVID-19]), AMPLIFIED PROBE TECHNIQUE, MAKING USE OF HIGH THROUGHPUT TECHNOLOGIES AS DESCRIBED BY CMS-2020-01-R: HCPCS | Performed by: NURSE PRACTITIONER

## 2020-09-02 PROCEDURE — 99283 EMERGENCY DEPT VISIT LOW MDM: CPT | Performed by: NURSE PRACTITIONER

## 2020-09-02 PROCEDURE — G0382 LEV 3 HOSP TYPE B ED VISIT: HCPCS | Performed by: NURSE PRACTITIONER

## 2020-09-02 PROCEDURE — 99203 OFFICE O/P NEW LOW 30 MIN: CPT | Performed by: NURSE PRACTITIONER

## 2020-09-02 NOTE — PROGRESS NOTES
NAME: Bailey Cosby is a 42 y o  male  : 1980    MRN: [id number]    Pulse 86   Temp 98 4 °F (36 9 °C)   Ht 6' 2" (1 88 m)   Wt 95 3 kg (210 lb)   SpO2 98%   BMI 26 96 kg/m²     Assessment and Plan   Fever and chills [R50 9]  1  Fever and chills  Novel Coronavirus (COVID-19), PCR [facility] - Office Collection       Sharyle Pontes was seen today for fever  Diagnoses and all orders for this visit:    Fever and chills  -     Novel Coronavirus (COVID-19), PCR [facility] - Office Collection        Patient Instructions   Patient Instructions   Stay home, dont go to work until [facility] tests are negative and you are symptom and fever free  May take up to 7 days for results  Download the [employer] mobile grazyna for results  Take tylenol for symptoms,       Proceed to ER if symptoms worsen  Chief Complaint     Chief Complaint   Patient presents with    Fever     pt has c/o fatigue, fever, loose stools and headache  symptoms started 3 days ago  pt had recent travel to [facility]             History of Present Illness     43 yo male here today with sore throat and recently returned from [facility]  He has been not feeling well, having loose stools, headaches and bodyaches  Pt needs the COVID test done as well to return back to work  He has taken tylenol for the fevers and pain      Review of Systems   Review of Systems   Constitutional: Positive for chills and fever  HENT: Negative  Eyes: Negative  Respiratory: Negative  Cardiovascular: Negative  Gastrointestinal: Positive for diarrhea and nausea  Genitourinary: Negative  Musculoskeletal: Negative  Neurological: Positive for headaches           Current Medications       Current Outpatient Medications:     lisinopril (ZESTRIL) 5 mg tablet, Take 1 tablet (5 mg total) by mouth daily (Patient not taking: Reported on 2020), Disp: 90 tablet, Rfl: 3    naproxen (NAPROSYN) 500 mg tablet, Take 1 tablet (500 mg total) by mouth every 12 (twelve) hours as needed for mild pain or moderate pain (Patient not taking: Reported on 10/26/2018 ), Disp: 15 tablet, Rfl: 0    Current Allergies     Allergies as of 09/02/2020    (No Known Allergies)              History reviewed  No pertinent past medical history  History reviewed  No pertinent surgical history  History reviewed  No pertinent family history  Medications have been verified  The following portions of the patient's history were reviewed and updated as appropriate: allergies, current medications, past family history, past medical history, past social history, past surgical history and problem list     Objective   Pulse 86   Temp 98 4 °F (36 9 °C)   Ht 6' 2" (1 88 m)   Wt 95 3 kg (210 lb)   SpO2 98%   BMI 26 96 kg/m²      Physical Exam     Physical Exam  Constitutional:       General: He is not in acute distress  Appearance: Normal appearance  He is not ill-appearing  Neck:      Musculoskeletal: Normal range of motion  Cardiovascular:      Rate and Rhythm: Normal rate and regular rhythm  Pulses: Normal pulses  Heart sounds: Normal heart sounds  Pulmonary:      Effort: Pulmonary effort is normal  No respiratory distress  Breath sounds: Normal breath sounds  No stridor  No wheezing, rhonchi or rales  Abdominal:      General: There is no distension  Palpations: There is no mass  Tenderness: There is no abdominal tenderness  There is no guarding or rebound  Hernia: No hernia is present  Musculoskeletal: Normal range of motion  Skin:     General: Skin is warm  Capillary Refill: Capillary refill takes less than 2 seconds  Neurological:      Mental Status: He is alert     Psychiatric:         Mood and Affect: Mood normal          SANTANA Hood

## 2020-09-02 NOTE — LETTER
September 2, 2020     Patient: Peggy Mackenzie   YOB: 1980   Date of Visit: 9/2/2020       To Whom It May Concern: It is my medical opinion that Peggy Mackenzie should remain out of work until all labs are resulted               Sincerely,        SANTANA Tinoco    CC: No Recipients

## 2020-09-02 NOTE — PATIENT INSTRUCTIONS
Stay home, dont go to work until [facility] tests are negative and you are symptom and fever free  May take up to 7 days for results  Download the [email or web address] mobile grazyna for results      Take tylenol for symptoms,

## 2020-09-04 LAB — SARS-COV-2 RNA SPEC QL NAA+PROBE: NOT DETECTED

## 2020-09-09 VITALS
HEIGHT: 74 IN | BODY MASS INDEX: 26.95 KG/M2 | OXYGEN SATURATION: 98 % | TEMPERATURE: 98.4 F | WEIGHT: 210 LBS | HEART RATE: 86 BPM

## 2022-01-05 ENCOUNTER — HOSPITAL ENCOUNTER (EMERGENCY)
Facility: HOSPITAL | Age: 42
Discharge: HOME | End: 2022-01-05
Attending: EMERGENCY MEDICINE
Payer: COMMERCIAL

## 2022-01-05 ENCOUNTER — AMB VIDEO VISIT (OUTPATIENT)
Dept: OTHER | Facility: HOSPITAL | Age: 42
End: 2022-01-05

## 2022-01-05 VITALS
OXYGEN SATURATION: 100 % | RESPIRATION RATE: 18 BRPM | DIASTOLIC BLOOD PRESSURE: 100 MMHG | HEART RATE: 94 BPM | SYSTOLIC BLOOD PRESSURE: 154 MMHG | BODY MASS INDEX: 23.24 KG/M2 | TEMPERATURE: 99.2 F | WEIGHT: 181 LBS

## 2022-01-05 DIAGNOSIS — J02.9 PHARYNGITIS: Primary | ICD-10-CM

## 2022-01-05 DIAGNOSIS — J06.9 VIRAL UPPER RESPIRATORY TRACT INFECTION: Primary | ICD-10-CM

## 2022-01-05 PROCEDURE — ECARE PR SL URGENT CARE VIRTUAL VISIT: Performed by: PHYSICIAN ASSISTANT

## 2022-01-05 PROCEDURE — 96372 THER/PROPH/DIAG INJ SC/IM: CPT

## 2022-01-05 PROCEDURE — 87070 CULTURE OTHR SPECIMN AEROBIC: CPT | Performed by: PHYSICIAN ASSISTANT

## 2022-01-05 PROCEDURE — 99282 EMERGENCY DEPT VISIT SF MDM: CPT

## 2022-01-05 PROCEDURE — 99284 EMERGENCY DEPT VISIT MOD MDM: CPT

## 2022-01-05 PROCEDURE — 87636 SARSCOV2 & INF A&B AMP PRB: CPT | Performed by: PHYSICIAN ASSISTANT

## 2022-01-05 RX ORDER — BENZOCAINE/MENTH/CETYLPYRD CL 15 MG-4 MG
1 LOZENGE MUCOUS MEMBRANE EVERY 2 HOUR PRN
Qty: 18 LOZENGE | Refills: 0 | Status: SHIPPED | OUTPATIENT
Start: 2022-01-05

## 2022-01-05 RX ORDER — NAPROXEN 500 MG/1
500 TABLET ORAL
Qty: 30 TABLET | Refills: 0 | Status: SHIPPED | OUTPATIENT
Start: 2022-01-05

## 2022-01-05 RX ORDER — KETOROLAC TROMETHAMINE 30 MG/ML
15 INJECTION, SOLUTION INTRAMUSCULAR; INTRAVENOUS ONCE
Status: COMPLETED | OUTPATIENT
Start: 2022-01-05 | End: 2022-01-05

## 2022-01-05 RX ADMIN — KETOROLAC TROMETHAMINE 15 MG: 30 INJECTION, SOLUTION INTRAMUSCULAR; INTRAVENOUS at 19:28

## 2022-01-05 NOTE — CARE ANYWHERE EVISITS
Visit Summary for DARIUS Thacker - Gender: Male - Date of Birth: [id number]  Date: [id number] - Duration: 8 minutes  Patient: DARIUS ALMANZA  Provider: Sriram Preciado PA-C    Patient Contact Information  Address  [address]; [address]  [phone]    Visit Topics    Triage Questions   What is your current physical address in the event of a medical emergency? Answer []  Are you allergic to any medications? Answer []  Are you now or could you be pregnant? Answer []  Do you have any immune system compromise or chronic lung   disease? Answer []  Do you have any vulnerable family members in the home (infant, pregnant, cancer, elderly)? Answer []     Conversation Transcripts  [0A][0A] [Notification] You are connected with Sriram Preciado PA-C, Urgent Care Specialist [0A][Notification] Kurt Zamora is located in [place]  [0A][Notification] Kurt Zamora has shared health history  Caitlin Zenguriah  [0A]    Diagnosis  Acute upper respiratory infection, unspecified    Procedures  Value: [id number] Code: CPT-4 UNLISTED E&M SERVICE    Medications Prescribed    No prescriptions ordered    Electronically signed by: Myra Le(NPI [id number])

## 2022-01-05 NOTE — PATIENT INSTRUCTIONS
Walk-In Locations - Skip [facility] Now  [address] ([facility])     Please Be Courteous:  You are being tested for novel coronavirus (COVID-19) your test is pending at this time  You need to self quarantine at least until you have the results back  This means go home and stay home  Have someone else  your medications for you and bring them to you and drop them off at your door  Tests typically come back in 1-3 days  Results can be found in the "COVID-19" section of your MyChart account  In Your Home:  If you live with other people, trying to avoid common spaces and disinfect areas that you come into contact with  Per the CDC's recommendations: persons who suspect that they might have COVID-19 should isolate, stay at home, and use a separate bedroom and bathroom if feasible  Isolation should begin even before seeking testing and before test results become available  All household members should start wearing a mask in the home, particularly in shared spaces where appropriate distancing is not possible  Take Care of Yourself:  Try to sleep on your stomach as much as possible  If you have the ability to, take vitamin D3 2000 IU by mouth daily, vitamin-C 1000 mg by mouth twice a day, a multivitamin daily to help boost your immune system  You should check your temperature twice day  Return to the emergency department for any severe shortness of breath or pulse ox less than 90%  If You Test Positive for COVID-19 (Isolate)    Everyone, regardless of vaccination status:    · Stay home for 5 days  · If you have no symptoms or your symptoms are resolving after 5 days, you can leave your house  · Continue to wear a mask around others for 5 additional days  If you have a fever, continue to stay home until your fever resolves      If You Were Exposed to Someone with COVID-19 Hussein Barney)  If you:  Have been boosted  OR  Completed the primary series of Pfizer or Charna Paget vaccine within the last 6 months  OR  Completed the primary series of J&J vaccine within the last 2 months    · Wear a mask around others for 10 days  · Test on day 5, if possible  If you develop symptoms get a test and stay home  If you:  Completed the primary series of Pfizer or Moderna vaccine over 6 months ago and are not boosted  OR  Completed the primary series of J&J over 2 months ago and are not boosted  OR  Are unvaccinated    · Stay home for 5 days  After that continue to wear a mask around others for 5 additional days  · If you cant quarantine you must wear a mask for 10 days  · Test on day 5 if possible      If you develop symptoms get a test and stay home

## 2022-01-05 NOTE — PROGRESS NOTES
Video Visit - Risa Mcdonald 46 y o  male MRN: [id number]    REQUIRED DOCUMENTATION:         1  This service was provided via [employer]  2  Provider located at [address] [place] [id number]  3  [facility] provider: Ambrosio Oro PA-C   4  Identify all parties in room with patient during [facility] visit:  significant other-permission granted  5  After connecting through [employer], patient was identified by name and date of birth  Patient was then informed that this was a Telemedicine visit and that the exam was being conducted confidentially over secure lines  My office door was closed  No one else was in the room  Patient acknowledged consent and understanding of privacy and security of the Telemedicine visit  I informed the patient that I have reviewed their record in Epic and presented the opportunity for them to ask any questions regarding the visit today  The patient agreed to participate  HPI  Pt having COVID sx x 5 days- fever, pain in toes, pain in throat (worst symptoms)  Taking theraflu with relief of fever  Hot tea helps some with throat  Using spray without relief  Denies loss of taste or smell, not tested for COVID  Not vaccinated  Physical Exam  Constitutional:       General: He is not in acute distress  Appearance: Normal appearance  He is not toxic-appearing  HENT:      Head: Normocephalic and atraumatic  Nose: No rhinorrhea  Mouth/Throat:      Mouth: Mucous membranes are moist       Pharynx: No oropharyngeal exudate or posterior oropharyngeal erythema  Comments: No swelling  Eyes:      Conjunctiva/sclera: Conjunctivae normal    Pulmonary:      Effort: Pulmonary effort is normal  No respiratory distress  Breath sounds: No wheezing (no gross audible wheeze through computer)  Musculoskeletal:      Cervical back: Normal range of motion  Skin:     Findings: No rash (on face or neck)  Neurological:      Mental Status: He is alert  Cranial Nerves: No dysarthria or facial asymmetry  Psychiatric:         Mood and Affect: Mood normal          Behavior: Behavior normal          Diagnoses and all orders for this visit:    Viral upper respiratory tract infection  -     COVID/FLU- Collected at   [facility]; Future  -     Throat culture; Future  -     Benzocaine-Menthol (Cepacol Sore Throat Max Numb) 15-4 MG LOZG; Apply 1 lozenge to the mouth or throat every 2 (two) hours as needed (sore throat)      Patient Instructions   Walk-In Locations - Skip The Wait  [facility] Now  [address] [facility] ([facility])     Please Be Courteous:  You are being tested for novel coronavirus (COVID-19) your test is pending at this time  You need to self quarantine at least until you have the results back  This means go home and stay home  Have someone else  your medications for you and bring them to you and drop them off at your door  Tests typically come back in 1-3 days  Results can be found in the "COVID-19" section of your MyChart account  In Your Home:  If you live with other people, trying to avoid common spaces and disinfect areas that you come into contact with  Per the CDC's recommendations: persons who suspect that they might have COVID-19 should isolate, stay at home, and use a separate bedroom and bathroom if feasible  Isolation should begin even before seeking testing and before test results become available  All household members should start wearing a mask in the home, particularly in shared spaces where appropriate distancing is not possible  Take Care of Yourself:  Try to sleep on your stomach as much as possible  If you have the ability to, take vitamin D3 2000 IU by mouth daily, vitamin-C 1000 mg by mouth twice a day, a multivitamin daily to help boost your immune system  You should check your temperature twice day    Return to the emergency department for any severe shortness of breath or pulse ox less than 90%  If You Test Positive for COVID-19 (Isolate)    Everyone, regardless of vaccination status:    · Stay home for 5 days  · If you have no symptoms or your symptoms are resolving after 5 days, you can leave your house  · Continue to wear a mask around others for 5 additional days  If you have a fever, continue to stay home until your fever resolves  If You Were Exposed to Someone with COVID-19 (Quarantine)  If you:  Have been boosted  OR  Completed the primary series of Pfizer or Moderna vaccine within the last 6 months  OR  Completed the primary series of J&J vaccine within the last 2 months    · Wear a mask around others for 10 days  · Test on day 5, if possible  If you develop symptoms get a test and stay home  If you:  Completed the primary series of Pfizer or Moderna vaccine over 6 months ago and are not boosted  OR  Completed the primary series of J&J over 2 months ago and are not boosted  OR  Are unvaccinated    · Stay home for 5 days  After that continue to wear a mask around others for 5 additional days  · If you cant quarantine you must wear a mask for 10 days  · Test on day 5 if possible      If you develop symptoms get a test and stay home

## 2022-01-06 NOTE — ED PROVIDER NOTES
History  Chief Complaint   Patient presents with    Sore Throat     pt reports bi laterally leg pain with subjective fevers and sore throat - pt reports earlier today had strep and covid test     The patient is a 46 YOM who presents to the ED reporting a 5 day history of sore throat and cough  He reports that he initially experienced bilateral leg pain described as a soreness but reports that this has resolved  He reports his sore throat is worsened with swallowing but denies any difficulty swallowing, drooling, or trismus  He last took Tylenol for his pain this morning and reports that it did not improve his symptoms  Of note, the patient was seen at an Urgent Care today and was swabbed for strep throat, COVID, and the Flu  Also reports subjective fever but denies measured fever  Otherwise denies chest pain, dyspnea, leg swelling, recent travel, wheezing, abdominal pain, nausea, and or vomiting  Prior to Admission Medications   Prescriptions Last Dose Informant Patient Reported? Taking? Benzocaine-Menthol (Cepacol Sore Throat Max Numb) 15-4 MG LOZG   No No   Sig: Apply 1 lozenge to the mouth or throat every 2 (two) hours as needed (sore throat)   lisinopril (ZESTRIL) 5 mg tablet   No No   Sig: Take 1 tablet (5 mg total) by mouth daily   Patient not taking: Reported on 9/2/2020   naproxen (NAPROSYN) 500 mg tablet   No No   Sig: Take 1 tablet (500 mg total) by mouth every 12 (twelve) hours as needed for mild pain or moderate pain   Patient not taking: Reported on 10/26/2018       Facility-Administered Medications: None       History reviewed  No pertinent past medical history  History reviewed  No pertinent surgical history  History reviewed  No pertinent family history  I have reviewed and agree with the history as documented      E-Cigarette/Vaping    E-Cigarette Use Never User      E-Cigarette/Vaping Substances     Social History     Tobacco Use    Smoking status: Never Smoker    Smokeless tobacco: Never Used   Vaping Use    Vaping Use: Never used   Substance Use Topics    Alcohol use: Yes     Comment: occasional    Drug use: No       Review of Systems   Constitutional: Negative for chills and diaphoresis  HENT: Positive for sore throat  Negative for drooling, facial swelling and trouble swallowing  Respiratory: Positive for cough  Negative for shortness of breath  Cardiovascular: Negative for chest pain and leg swelling  Gastrointestinal: Negative for abdominal pain, constipation, diarrhea, nausea and vomiting  Genitourinary: Negative for dysuria and flank pain  Musculoskeletal: Negative for arthralgias, myalgias and neck stiffness  Skin: Negative for rash and wound  Neurological: Negative for dizziness, weakness, numbness and headaches  Physical Exam  Physical Exam  Vitals and nursing note reviewed  Constitutional:       Appearance: He is well-developed  Comments: Patient is speaking softly secondary to pain, however voice is not muffled   HENT:      Head: Normocephalic and atraumatic  Mouth/Throat:      Mouth: Mucous membranes are moist  No oral lesions  Pharynx: Uvula midline  Posterior oropharyngeal erythema present  No pharyngeal swelling, oropharyngeal exudate or uvula swelling  Tonsils: No tonsillar exudate or tonsillar abscesses  Eyes:      Conjunctiva/sclera: Conjunctivae normal    Cardiovascular:      Rate and Rhythm: Normal rate and regular rhythm  Heart sounds: No murmur heard  Pulmonary:      Effort: Pulmonary effort is normal  No respiratory distress  Breath sounds: Normal breath sounds  No stridor  Abdominal:      Palpations: Abdomen is soft  Tenderness: There is no abdominal tenderness  Musculoskeletal:      Cervical back: Neck supple  Skin:     General: Skin is warm and dry  Capillary Refill: Capillary refill takes less than 2 seconds  Neurological:      Mental Status: He is alert           Vital Signs  ED Triage Vitals [01/05/22 1824]   Temperature Pulse Respirations Blood Pressure SpO2   99 2 °F (37 3 °C) 94 18 154/100 100 %      Temp Source Heart Rate Source Patient Position - Orthostatic VS BP Location FiO2 (%)   Oral Monitor Sitting Right arm --      Pain Score       --           Vitals:    01/05/22 1824   BP: 154/100   Pulse: 94   Patient Position - Orthostatic VS: Sitting         Visual Acuity      ED Medications  Medications   ketorolac (TORADOL) injection 15 mg (15 mg Intramuscular Given 1/5/22 1928)       Diagnostic Studies  Results Reviewed     None                 No orders to display              Procedures  Procedures         ED Course           MDM  Number of Diagnoses or Management Options  Patient is a 46 YOM who presents to the ED complaining of sore throat and cough  He was swabbed for COVID/Flu and Strep Throat earlier today while seen at an Urgent Care and has been taking Tylenol for pain without relief  Patient has a patent airway with no tonsillar edema or uvular deviation  His voice is not muffled and he is not drooling  Patient is afebrile in the ED; last took Tylenol earlier in the morning  Ddx includes Strep throat ([facility] Criteria: patient with a cough, without a fever, without cervical adenopathy, without exudate- will not give prophylactic treatment), COVID-19, unspecified viral pharyngitis  Unlikely epiglottitis or peritonsillar abscess; will treat with Toradol and re-evaluate for improvement prior to discharge  The patient received Toradol in the ED with significant improvement in pain  Patient is no longer speaking at a low volume, indicating improvement in pain  At the time of discharge, the patient is in no acute distress  I discussed with the patient the diagnosis, treatment plan, follow-up, return precautions, and discharge instructions; they were given the opportunity to ask questions and verbalized understanding      Disposition  Final diagnoses:   Pharyngitis Time reflects when diagnosis was documented in both MDM as applicable and the Disposition within this note     Time User Action Codes Description Comment    1/5/2022  8:30 PM Luana Riley Add [J02 9] Pharyngitis       ED Disposition     ED Disposition Condition Date/Time Comment    Discharge Stable Wed Jan 5, 2022  8:32 PM Lauri Roman discharge to home/self care  Follow-up Information    None         Discharge Medication List as of 1/5/2022  8:34 PM      START taking these medications    Details   !! naproxen (Naprosyn) 500 mg tablet Take 1 tablet (500 mg total) by mouth 3 (three) times a day with meals, Starting Wed 1/5/2022, Normal       !! - Potential duplicate medications found  Please discuss with provider  CONTINUE these medications which have NOT CHANGED    Details   Benzocaine-Menthol (Cepacol Sore Throat Max Numb) 15-4 MG LOZG Apply 1 lozenge to the mouth or throat every 2 (two) hours as needed (sore throat), Starting Wed 1/5/2022, Normal      lisinopril (ZESTRIL) 5 mg tablet Take 1 tablet (5 mg total) by mouth daily, Starting Fri 10/26/2018, Normal      !! naproxen (NAPROSYN) 500 mg tablet Take 1 tablet (500 mg total) by mouth every 12 (twelve) hours as needed for mild pain or moderate pain, Starting Mon 10/15/2018, Print       !! - Potential duplicate medications found  Please discuss with provider  No discharge procedures on file      PDMP Review     None          ED Provider  Electronically Signed by XIAO Mcneill PA-C  01/05/22 6973

## 2022-01-06 NOTE — DISCHARGE INSTRUCTIONS
Follow up with a primary care physician in 2 days  Take Naprosyn as prescribed  Return to the ED for any difficulty swallowing, drooling, worsening pain, difficulty breathing, and or new or concerning symptoms

## 2023-01-16 ENCOUNTER — APPOINTMENT (OUTPATIENT)
Dept: LAB | Facility: CLINIC | Age: 43
End: 2023-01-16

## 2023-01-16 ENCOUNTER — OFFICE VISIT (OUTPATIENT)
Dept: FAMILY MEDICINE CLINIC | Facility: CLINIC | Age: 43
End: 2023-01-16

## 2023-01-16 VITALS
HEIGHT: 74 IN | WEIGHT: 204 LBS | BODY MASS INDEX: 26.18 KG/M2 | TEMPERATURE: 98.3 F | DIASTOLIC BLOOD PRESSURE: 86 MMHG | SYSTOLIC BLOOD PRESSURE: 124 MMHG | OXYGEN SATURATION: 99 % | HEART RATE: 79 BPM | RESPIRATION RATE: 17 BRPM

## 2023-01-16 DIAGNOSIS — E66.3 OVERWEIGHT (BMI 25.0-29.9): Primary | ICD-10-CM

## 2023-01-16 DIAGNOSIS — R10.9 LEFT SIDED ABDOMINAL PAIN: ICD-10-CM

## 2023-01-16 DIAGNOSIS — Z00.00 ENCOUNTER FOR MEDICAL EXAMINATION TO ESTABLISH CARE: ICD-10-CM

## 2023-01-16 DIAGNOSIS — E66.3 OVERWEIGHT (BMI 25.0-29.9): ICD-10-CM

## 2023-01-16 PROBLEM — I10 ESSENTIAL HYPERTENSION: Status: RESOLVED | Noted: 2018-10-26 | Resolved: 2023-01-16

## 2023-01-16 LAB
ANION GAP SERPL CALCULATED.3IONS-SCNC: 7 MMOL/L (ref 4–13)
BUN SERPL-MCNC: 13 MG/DL (ref 5–25)
CALCIUM SERPL-MCNC: 9.5 MG/DL (ref 8.3–10.1)
CHLORIDE SERPL-SCNC: 105 MMOL/L (ref 96–108)
CHOLEST SERPL-MCNC: 188 MG/DL
CO2 SERPL-SCNC: 29 MMOL/L (ref 21–32)
CREAT SERPL-MCNC: 0.92 MG/DL (ref 0.6–1.3)
EST. AVERAGE GLUCOSE BLD GHB EST-MCNC: 97 MG/DL
GFR SERPL CREATININE-BSD FRML MDRD: 103 ML/MIN/1.73SQ M
GLUCOSE P FAST SERPL-MCNC: 92 MG/DL (ref 65–99)
HBA1C MFR BLD: 5 %
HDLC SERPL-MCNC: 69 MG/DL
LDLC SERPL CALC-MCNC: 103 MG/DL (ref 0–100)
POTASSIUM SERPL-SCNC: 3.8 MMOL/L (ref 3.5–5.3)
SODIUM SERPL-SCNC: 141 MMOL/L (ref 135–147)
TRIGL SERPL-MCNC: 79 MG/DL

## 2023-01-16 NOTE — PROGRESS NOTES
Name: Sarath Umanzor      : 1980      MRN: [id number]  Encounter Provider: SANTANA Pena  Encounter Date: 2023   Encounter department: [place] [id number]    Assessment & Plan     1  Overweight (BMI 25 0-29 9)  -     Hemoglobin A1C; Future  -     Basic metabolic panel; Future  -     Lipid Panel with Direct LDL reflex; Future    2  Encounter for medical examination to establish care    3  Left sided abdominal pain  Assessment & Plan:  - Reassured pt that this was likely muscle skeletal in nature  No intervention or diagnostic recommended at this time   - RTC if pain returns/persists  BMI Counseling: Body mass index is 26 19 kg/m²  The BMI is above normal  Nutrition recommendations include decreasing portion sizes, encouraging healthy choices of fruits and vegetables, decreasing fast food intake, consuming healthier snacks, limiting drinks that contain sugar, moderation in carbohydrate intake, increasing intake of lean protein, reducing intake of saturated and trans fat and reducing intake of cholesterol  Exercise recommendations include moderate physical activity 150 minutes/week  No pharmacotherapy was ordered  Rationale for BMI follow-up plan is due to patient being overweight or obese  Depression Screening and Follow-up Plan: Patient was screened for depression during today's encounter  They screened negative with a PHQ-2 score of 0  Subjective      Sarath Umanzor is a 38 y o  male  has no past medical history on file  has no past surgical history on file  He presents today to establish care  He reports a history of left sided abdominal pain that was present in December for about 2 weeks  It has resolved since then  He did not take anything for it  Does not recall a specific event that incited pain, however, he works in construction and reports it could have possibly occurred from [employer] [address]       Review of Systems   Constitutional: Negative for chills and fever  HENT: Negative for ear pain and sore throat  Eyes: Negative for pain and visual disturbance  Respiratory: Negative for cough and shortness of breath  Cardiovascular: Negative for chest pain and palpitations  Gastrointestinal: Negative for abdominal pain and vomiting  Genitourinary: Negative for dysuria and hematuria  Musculoskeletal: Negative for arthralgias and back pain  Skin: Negative for color change and rash  Neurological: Negative for seizures and syncope  All other systems reviewed and are negative  Current Outpatient Medications on File Prior to Visit   Medication Sig   • [DISCONTINUED] Benzocaine-Menthol (Cepacol Sore Throat Max Numb) 15-4 MG LOZG Apply 1 lozenge to the mouth or throat every 2 (two) hours as needed (sore throat)   • [DISCONTINUED] lisinopril (ZESTRIL) 5 mg tablet Take 1 tablet (5 mg total) by mouth daily (Patient not taking: Reported on 9/2/2020)   • [DISCONTINUED] naproxen (NAPROSYN) 500 mg tablet Take 1 tablet (500 mg total) by mouth every 12 (twelve) hours as needed for mild pain or moderate pain (Patient not taking: Reported on 10/26/2018 )   • [DISCONTINUED] naproxen (Naprosyn) 500 mg tablet Take 1 tablet (500 mg total) by mouth 3 (three) times a day with meals       Objective     /86 (BP Location: Right arm, Patient Position: Sitting, Cuff Size: Standard)   Pulse 79   Temp 98 3 °F (36 8 °C) (Temporal)   Resp 17   Ht 6' 2" (1 88 m)   Wt 92 5 kg (204 lb)   SpO2 99%   BMI 26 19 kg/m²     Physical Exam  Vitals and nursing note reviewed  Constitutional:       General: He is not in acute distress  Appearance: He is not ill-appearing  HENT:      Head: Normocephalic and atraumatic  Right Ear: External ear normal  There is no impacted cerumen  Left Ear: External ear normal  There is no impacted cerumen  Nose: Nose normal  No congestion  Eyes:      Pupils: Pupils are equal, round, and reactive to light  Cardiovascular:      Rate and Rhythm: Normal rate and regular rhythm  Pulses: Normal pulses  Heart sounds: Normal heart sounds  No murmur heard  Pulmonary:      Effort: Pulmonary effort is normal       Breath sounds: Normal breath sounds  Abdominal:      General: Bowel sounds are normal       Palpations: Abdomen is soft  Tenderness: There is no abdominal tenderness  Musculoskeletal:         General: No tenderness  Normal range of motion  Cervical back: Normal range of motion  No tenderness  Skin:     General: Skin is warm and dry  Neurological:      General: No focal deficit present  Mental Status: He is alert and oriented to person, place, and time  Psychiatric:         Mood and Affect: Mood normal          Behavior: Behavior normal          Thought Content:  Thought content normal          Judgment: Judgment normal        Leigha Dominick

## 2023-01-16 NOTE — ASSESSMENT & PLAN NOTE
- Reassured pt that this was likely muscle skeletal in nature  No intervention or diagnostic recommended at this time   - RTC if pain returns/persists

## 2023-03-28 ENCOUNTER — OFFICE VISIT (OUTPATIENT)
Dept: FAMILY MEDICINE CLINIC | Facility: CLINIC | Age: 43
End: 2023-03-28

## 2023-03-28 ENCOUNTER — APPOINTMENT (OUTPATIENT)
Dept: LAB | Facility: CLINIC | Age: 43
End: 2023-03-28

## 2023-03-28 VITALS
HEIGHT: 74 IN | RESPIRATION RATE: 18 BRPM | TEMPERATURE: 97.1 F | WEIGHT: 205 LBS | DIASTOLIC BLOOD PRESSURE: 84 MMHG | BODY MASS INDEX: 26.31 KG/M2 | SYSTOLIC BLOOD PRESSURE: 136 MMHG | OXYGEN SATURATION: 99 % | HEART RATE: 76 BPM

## 2023-03-28 DIAGNOSIS — Z11.4 SCREENING FOR HIV (HUMAN IMMUNODEFICIENCY VIRUS): ICD-10-CM

## 2023-03-28 DIAGNOSIS — R10.31 GROIN DISCOMFORT, RIGHT: ICD-10-CM

## 2023-03-28 DIAGNOSIS — R10.31 GROIN DISCOMFORT, RIGHT: Primary | ICD-10-CM

## 2023-03-28 DIAGNOSIS — Z11.59 ENCOUNTER FOR HEPATITIS C SCREENING TEST FOR LOW RISK PATIENT: ICD-10-CM

## 2023-03-28 LAB
SL AMB  POCT GLUCOSE, UA: NORMAL
SL AMB LEUKOCYTE ESTERASE,UA: NEGATIVE
SL AMB POCT BILIRUBIN,UA: NEGATIVE
SL AMB POCT BLOOD,UA: NEGATIVE
SL AMB POCT CLARITY,UA: CLEAR
SL AMB POCT COLOR,UA: YELLOW
SL AMB POCT KETONES,UA: NEGATIVE
SL AMB POCT NITRITE,UA: NEGATIVE
SL AMB POCT PH,UA: 9
SL AMB POCT SPECIFIC GRAVITY,UA: 1
SL AMB POCT URINE PROTEIN: NEGATIVE
SL AMB POCT UROBILINOGEN: NEGATIVE

## 2023-03-28 NOTE — LETTER
March 28, 2023     Patient: Maile Schneider   YOB: 1980   Date of Visit: 3/28/2023       To Whom it May Concern:    Maile Schneider was seen in my clinic on 3/28/2023  He may return to work on 3/28/2023 with weight lifting restriction of 20 pounds for 1 week  If you have any questions or concerns, please don't hesitate to call           Sincerely,          [employer] [facility]        CC: No Recipients

## 2023-03-28 NOTE — PROGRESS NOTES
"Name: Linda Castillo      : 1980      MRN: [id number]  Encounter Provider: SANTANA Campos  Encounter Date: 3/28/2023   Encounter department: [facility]    Assessment & Plan     1  Groin discomfort, right  -     POCT urine dip  -     Chlamydia/GC amplified DNA by PCR  -     : HIV 1/2 AB/AG w Reflex [facility] for 2 yr old and above; Future  -     Hepatitis C antibody; Future  -     RPR-Syphilis Screening (Total Syphilis IGG/IGM); Future  -     US groin/inguinal area; Future; Expected date: 2023    2  Encounter for hepatitis C screening test for low risk patient  -     Hepatitis C antibody; Future    3  Screening for HIV (human immunodeficiency virus)  -     : HIV 1/2 AB/AG w Reflex [facility] for 2 yr old and above; Future           Subjective     39 YO male presents for same day visit due to right groin pain  Onset was several days ago  No identifiable injury  Radiation to RLQ  Pain is described as pinching and 5/10  He feels a \"ball\" in the area  Has not tried any treatment  Walking or sitting for prolonged period makes pain worse  Review of Systems   Constitutional: Negative for chills and fever  HENT: Negative for ear pain and sore throat  Eyes: Negative for pain and visual disturbance  Respiratory: Negative for cough and shortness of breath  Cardiovascular: Negative for chest pain and palpitations  Gastrointestinal: Positive for abdominal pain  Negative for vomiting         +groin pain    Genitourinary: Negative for dysuria and hematuria  Musculoskeletal: Negative for arthralgias and back pain  Skin: Negative for color change and rash  Neurological: Negative for seizures and syncope  All other systems reviewed and are negative  No past medical history on file  No past surgical history on file  No family history on file    Social History     Socioeconomic History   • Marital status: /Civil Union     Spouse name: None   • " "Number of children: None   • Years of education: None   • Highest education level: None   Occupational History   • None   Tobacco Use   • Smoking status: Never   • Smokeless tobacco: Never   Vaping Use   • Vaping Use: Never used   Substance and Sexual Activity   • Alcohol use: Yes     Comment: occasional   • Drug use: No   • Sexual activity: None   Other Topics Concern   • None   Social History Narrative   • None     Social Determinants of Health     Financial Resource Strain: Low Risk    • Difficulty of Paying Living Expenses: Not hard at all   Food Insecurity: No Food Insecurity   • Worried About Running Out of Food in the Last Year: Never true   • Ran Out of Food in the Last Year: Never true   Transportation Needs: No Transportation Needs   • Lack of Transportation (Medical): No   • Lack of Transportation (Non-Medical): No   Physical Activity: Not on file   Stress: Not on file   Social Connections: Not on file   Intimate Partner Violence: Not on file   Housing Stability: Not on file     No current outpatient medications on file prior to visit  No Known Allergies    There is no immunization history on file for this patient  Objective     /84 (BP Location: Right arm, Patient Position: Sitting, Cuff Size: Standard)   Pulse 76   Temp (!) 97 1 °F (36 2 °C) (Temporal)   Resp 18   Ht 6' 2\" (1 88 m)   Wt 93 kg (205 lb)   SpO2 99%   BMI 26 32 kg/m²     Physical Exam  Vitals and nursing note reviewed  Exam conducted with a chaperone present  Constitutional:       General: He is not in acute distress  Appearance: He is well-developed  He is not diaphoretic  HENT:      Head: Normocephalic and atraumatic  Eyes:      Pupils: Pupils are equal, round, and reactive to light  Cardiovascular:      Rate and Rhythm: Normal rate and regular rhythm  Pulmonary:      Effort: Pulmonary effort is normal  No respiratory distress  Breath sounds: Normal breath sounds  No wheezing     Abdominal:      " General: Bowel sounds are normal  There is no distension  Palpations: Abdomen is soft  Tenderness: There is no abdominal tenderness  There is no right CVA tenderness, left CVA tenderness, guarding or rebound  Hernia: There is no hernia in the left inguinal area or right inguinal area  Genitourinary:      Musculoskeletal:      Cervical back: Normal range of motion and neck supple  Lymphadenopathy:      Cervical: No cervical adenopathy  Lower Body: No right inguinal adenopathy  No left inguinal adenopathy  Skin:     General: Skin is warm and dry  Capillary Refill: Capillary refill takes less than 2 seconds  Neurological:      Mental Status: He is alert and oriented to person, place, and time     Psychiatric:         Behavior: Behavior normal        Primus Fern

## 2023-03-28 NOTE — PATIENT INSTRUCTIONS
Hernia inguinal   LO QUE NECESITA SABER:   Gail Danes hernia inguinal ocurre cuando los órganos o el tejido abdominal se fuerzan a través de un área débil de la pared abdominal y pasan a través de ésta  La pared abdominal está formada de grasa y músculo  Eso mantiene a los intestinos en caban lugar  La hernia podría contener líquido, tejido del abdomen o parte de un órgano (cuba un intestino)  INSTRUCCIONES SOBRE [facility]:   Regrese a la anum de emergencias si:  Usted tiene un intenso dolor abdominal con náuseas y vómitos  Caban abdomen está más ness de lo normal     Caban hernia se hace más ness o se ve morada o madi  Usted ve clara en bob deposiciones  Usted se siente mareado, débil o tiene sensación de [place]  Comuníquese con caban médico si:  Tiene fiebre  Usted tiene preguntas o inquietudes acerca de caban condición o cuidado  Medicamento: Usted podría necesitar lo siguiente:  ZARIA cuba el ibuprofeno, ayudan a disminuir la inflamación, el dolor y la fiebre  Los ZARIA pueden causar sangrado estomacal o problemas renales en ciertas personas  Si usted alma un medicamento anticoagulante, siempre pregúntele a caban médico si los ZARIA son seguros para usted  Siempre annabella la etiqueta de shakila medicamento y [place] instrucciones  [place] bob medicamentos cuba se le haya indicado  Consulte con caban médico si usted carmen que caban medicamento no le está ayudando o si presenta efectos secundarios  Infórmele al médico si usted es alérgico a algún medicamento  Mantenga rayne lista actualizada de los [address] [facility], las vitaminas y los productos herbales que alma  Incluya los siguientes datos de los medicamentos: cantidad, frecuencia y motivo de administración  Traiga con usted la lista o los envases de las píldoras a bob citas de seguimiento  Lleve la lista de los medicamentos con usted en orion de rayne emergencia      Acuda a bob consultas de control con caban médico según le indicaron: Anote bob preguntas para que se acuerde de hacerlas marlon bob visitas  Controle bob síntomas y evite otra hernia:  No levante nada pesado  El levantar cosas pesadas puede empeorar caban hernia o provocar otra  Consulte con caban médico cuánto peso puede usted levantar sin riesgo  [place] líquidos cuba se le haya indicado  Los líquidos podrían evitar el estreñimiento y el esfuerzo marlon rayne evacuación intestinal  Pregunte cuánto líquido debe rené cada día y cuáles líquidos son los más adecuados para usted  [place] alimentos ricos en fibras  La fibra podría evitar el estreñimiento y el esfuerzo marlon rayne evacuación intestinal  Los alimentos que contienen fibra incluyen a las frutas, verduras, frijoles, lentejas y granos enteros  Mantenga un peso saludable  Si usted tiene sobrepeso, la pérdida de peso podría evitar que caban hernia empeore  También podría evitar el desarrollo de otra hernia  Hable con caban médico acerca del ejercicio y cómo perder peso de manera hollins si tiene sobrepeso  No fume  La nicotina y otros químicos en los cigarrillos y los cigarros pueden debilitar caban pared abdominal  [place] podría aumentar caban riesgo de desarrollar otra hernia  Pida información a caban médico si usted actualmente fuma y necesita ayuda para dejar de fumar  Los cigarrillos electrónicos o el tabaco sin humo igualmente contienen nicotina  Consulte con caban médico antes de [employer]  © Copyright Erna Push 2022 Information is for End User's use only and may not be sold, redistributed or otherwise used for commercial purposes  Esta información es sólo para uso en educación  Caban intención no es darle un consejo médico sobre enfermedades o tratamientos  Colsulte con caban Kathye Garb farmacéutico antes de seguir cualquier régimen médico para saber si es seguro y efectivo para usted

## 2023-03-29 LAB
HCV AB SER QL: NORMAL
HIV 1+2 AB+HIV1 P24 AG SERPL QL IA: NORMAL
HIV 2 AB SERPL QL IA: NORMAL
HIV1 AB SERPL QL IA: NORMAL
HIV1 P24 AG SERPL QL IA: NORMAL
TREPONEMA PALLIDUM IGG+IGM AB [PRESENCE] IN SERUM OR PLASMA BY IMMUNOASSAY: NORMAL

## 2023-03-30 LAB
C TRACH DNA SPEC QL NAA+PROBE: NEGATIVE
N GONORRHOEA DNA SPEC QL NAA+PROBE: NEGATIVE

## 2023-03-31 ENCOUNTER — HOSPITAL ENCOUNTER (OUTPATIENT)
Dept: ULTRASOUND IMAGING | Facility: HOSPITAL | Age: 43
Discharge: HOME | End: 2023-03-31

## 2023-03-31 DIAGNOSIS — K40.90 RIGHT INGUINAL HERNIA: Primary | ICD-10-CM

## 2023-03-31 DIAGNOSIS — R10.31 GROIN DISCOMFORT, RIGHT: ICD-10-CM

## 2023-05-03 ENCOUNTER — OFFICE VISIT (OUTPATIENT)
Dept: SURGERY | Facility: CLINIC | Age: 43
End: 2023-05-03

## 2023-05-03 VITALS
TEMPERATURE: 97.5 F | HEART RATE: 78 BPM | HEIGHT: 74 IN | OXYGEN SATURATION: 98 % | BODY MASS INDEX: 26.31 KG/M2 | WEIGHT: 205 LBS

## 2023-05-03 DIAGNOSIS — K40.90 RIGHT INGUINAL HERNIA: Primary | ICD-10-CM

## 2023-05-03 RX ORDER — OXYCODONE HYDROCHLORIDE AND ACETAMINOPHEN 5; 325 MG/1; MG/1
1 TABLET ORAL EVERY 4 HOURS PRN
COMMUNITY

## 2023-05-03 NOTE — PROGRESS NOTES
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 and walked around a lot and had some discomfort afterwards  He took it easy the next day  Since then he has been doing fine  He says the wound is healing well  He is tolerating his diet  He has good GI function  He is voiding without incident  Physical exam Young adult  male awake alert no distress    Abdomen flat firm nontender his incision is healing well with no evidence of infection or recurrence  Is  Cosmetic result  Impression: Doing well status post right inguinal hernia repair with mesh  Plan: At this point he is discharged in the office  He is told to do no heavy lifting for an additional 2 weeks  After that he should essentially have no restrictions  He may return here on a as needed basis  It was a pleasure to meet him  He is a great henrique

## 2023-05-17 ENCOUNTER — OFFICE VISIT (OUTPATIENT)
Dept: SURGERY | Facility: CLINIC | Age: 43
End: 2023-05-17

## 2023-05-17 VITALS
HEIGHT: 74 IN | WEIGHT: 205 LBS | OXYGEN SATURATION: 99 % | BODY MASS INDEX: 26.31 KG/M2 | TEMPERATURE: 97.6 F | HEART RATE: 73 BPM

## 2023-05-17 DIAGNOSIS — K40.90 RIGHT INGUINAL HERNIA: Primary | ICD-10-CM

## 2023-05-17 NOTE — PROGRESS NOTES
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